# Patient Record
(demographics unavailable — no encounter records)

---

## 2018-06-14 NOTE — HISTORY AND PHYSICAL REPORT
History of Present Illness


Date of examination: 18


Date of admission: 





18 planed date of admission


Chief complaint: 





here for c/s and BTL


History of present illness: 


Pt presents for repeat c/s x 1 with BTL. All risk/benefits/alternatives were d/

w pt and questions were addressed and answered. Consents signed and given pt pt 

to present on day of surgery. 





Current Allergies (reviewed today):


No known allergies


EDC Confirmation:  2018


Gestational Age:  18 5/7 weeks





Past Pregnancy History 


   :      3


   Term Births:      3


   Living Children:   3


   Para:         2


   Mult. Births:      0


   Prev :   1


   Aborta:      0


   Elect. Ab:      0


   Spont. Ab:      0


   Ectopics:      0





Pregnancy # 1


   Delivery date:     


   Weeks Gestation:   term


    labor:     no


   Delivery type:     


   Delivery location:     [facility]


   Infant Sex:      Male


   Birth weight:      6#10





Pregnancy # 2


   Delivery date:     


   Weeks Gestation:   term


   Delivery type:     


   Delivery location:     [place]


   Infant Sex:      femal/male


   Birth weight:      4#11/


   Comments:      Twin gestation, del c/s d/t position








Past Medical History:


   Negative Past Medical History





Past Surgical History:


   





Past Medical History 


Surgery (Non-gyn): 





Abnormal PAP: positive, 





Family Hx: mother - lupus


father - HTN


MGM - rare blood cancer





Social Hx: 


no ETOH/Drugs/Smoking





Infection History 


Hx of STD: none


HIV Risk Eval: no


Hepatitis B Risk Eval: low risk


Personal hx. of genital herpes: no


Partner hx. of genital herpes: no


Rash, Viral, or Febrile illness since last LMP? no


Varicella/Chicken Pox Status: Previous Disease





Genetic History 


 Congenital Heart Defect:


    Mom: no  Dad: no


Canavan Disease:


    Mom: no  Dad: no


Thalassemia


    Mom: no  Dad: no


Neural Tube Defect


    Mom: no  Dad: no


Down's Syndrome


    Mom: no  Dad: no


Tadeo-Sachs


    Mom: no  Dad: no


Sickle Cell Disease/Trait


    Mom: no  Dad: no


Hemophilia


    Mom: no  Dad: no


Muscular Dystrophy


    Mom: no  Dad: no


Cystic Fibrosis


    Mom: no  Dad: no


Prowers Chorea


    Mom: no  Dad: no


Mental Retardation


    Mom: no  Dad: no


Fragile X


    Mom: no  Dad: no


Other Genetic/Chromosomal Disorder


    Mom: no  Dad: no


Child w/other birth defect


    Mom: no  Dad: no





Enviromental Exposures 


Xray Exposure: no


Medication, drug, or alcohol use since LMP: no


Chemical/Other Exposure: no


Exposure to Cat Liter: no


Hx of Parvovirus (Fifth Disease): no


Occupational Exposure to Children: none


Active Medications (reviewed today):


None





Current Allergies (reviewed today):


No known allergies





Past History


Past Medical History: no pertinent history


Past Surgical History:  section


GYN History: denies: abnormal PAP smear


Social history: no significant social history, 





- Obstetrical History


Expected Date of Delivery: 18


Actual Gestation: 38 Week(s) 5 Day(s) 





Medications and Allergies


 Allergies











Allergy/AdvReac Type Severity Reaction Status Date / Time


 


No Known Allergies Allergy   Unverified 18 14:20











 Home Medications











 Medication  Instructions  Recorded  Confirmed  Last Taken  Type


 


No Known Home Medications [No  18 Unknown History





Reported Home Medications]     














Review of Systems


All systems: negative





- Physical Exam


Cardiovascular: Normal S1, Normal S2


Lungs: Positive: Clear to auscultation, Normal air movement


Abdomen: Positive: normal appearance, soft.  Negative: distention, tenderness, 

guarding


Genitourinary (Female): Positive: normal external genitalia, normal perenium


Vagina: Positive: normal moisture


Extremities: Positive: edema (2+ dependent b/l)





- Obstetrical


FHR: auscultation normal





Results


All other labs normal.








Assessment and Plan





- Patient Problems


(1) 39 weeks gestation of pregnancy


Status: Acute   





(2) Previous  section


Status: Acute   


Plan to address problem: 


-admit for above procedure


-consents signed and to be placed on the chart








(3) Encounter for sterilization


Status: Acute   


Plan to address problem: 


-consents singed


-alternatives and risk discussed w/ pt and questions were addressed and 

answered.

## 2018-06-18 NOTE — OPERATIVE REPORT
Operative Report


Operative Report: 


Date of procedure: 2018





Pre-operative diagnosis: 39 weeks gestation


                                 Previous  section 1


                                 Desires permanent sterilization





Post-operative diagnosis: Same





Procedure name(s): Repeat lower transverse  section via Pfannenstiel 

skin incision


Bilateral tubal ligation via modified [place] method





Surgeon: Dr. Russo





Assistant: Ms. Shayla Hilario CST





Anesthesia: Combined spinal epidural





EBL: 600 mL





Urine output: 50 mL of clear urine out at the end of the procedure





Fluids: 1500 mL





Findings: Liveborn female infant weight 7 lbs. 2 oz. Apgars of 8 and 9 at one 

and 5 minutes


            Nuchal cord 1 easily reduced


            Grossly normal fallopian tubes and ovaries bilaterally





Indications: L[place]na presents for scheduled repeat  section with 

bilateral tubal ligation.  All risks benefits and alternatives were discussed 

with the patient.  Patient was given ample time to ask questions all of which 

were addressed and answered.  Consents were signed and placed on the chart.





Procedure: Patient was taking to the operating room.  Patient was then prepped 

and draped in sterile fashion after anesthesia was found to be adequate.  A low 

transverse skin incision was made with the scalpel through previous incisional 

scar and carried down to the underlying layer of fascia with the Bovie.  The 

fascia was then incised in the midline and this incision was extended 

bilaterally with the Bovie.  The superior aspect of the fascia was grasped with 

Kocher clamps tented upward and dissected off of the anterior rectus muscles 

with the scalpel.  In similar fashion the inferior aspect of the fascia was 

grasped with Kocher clamps tented upward and dissected off of the anterior 

rectus muscles.  The rectus muscles were then bluntly divided in the midline.  

The peritoneum was identified and entered into sharply.  The Gurinder retractor 

was placed   A lower transverse uterine incision was made with the scalpel and 

extended bilaterally with the bandage scissors.  Artificial rupture of 

membranes was performed yielding clear amniotic fluid.  The infant's head was 

then delivered atraumatically.  The anterior shoulder and rest of infant 

delivered without difficulty.  The umbilical cord was clamped x2.  The cord was 

cut.  The infant was then placed in sterile bassinet.  The cord blood was 

collected.  The placenta was manually extracted in its entirety.  The uterus 

was exteriorized and cleared of all clots and debris.  The uterine incision was 

closed using 0 Vicryl in a running locking fashion.  A second imbricating layer 

of the same suture was then created.  Attention was then turned to the 

fallopian tubes.  A knuckle of the tube was grasp with the Moriah suture 

ligated and transected with a portion of the tube passed off to pathology.  The 

tube was ligated 2 prior to transecting it.  This was repeated bilaterally.  

Hemostasis was noted.  The posterior cul-de-sac was copiously irrigated.  The 

uterus was returned to the abdomen.  The gutters were also irrigated.  The 

anterior rectus muscles were reapproximated using 3-0 Vicryl.  The anterior 

rectus fascia was reapproximated using 0 Vicryl in a running fashion.  The 

subcuticular fat was reapproximated using 2-0 Vicryl in a running fashion.  The 

skin was reapproximated with staples.  The patient tolerated the procedure 

well.  Sponge lap and needle counts were all correct x3.  Patient was taken to 

the recovery room awake and in stable condition.

## 2018-06-18 NOTE — ANESTHESIA CONSULTATION
Anesthesia Consult and Med Hx


Date of service: 06/18/18





- Airway


Anesthetic Teeth Evaluation: Good


ROM Head & Neck: Adequate


Mental/Hyoid Distance: Adequate


Mallampati Class: Class II


Intubation Access Assessment: Probably Good





- Pre-Operative Health Status


ASA Pre-Surgery Classification: ASA3


Proposed Anesthetic Plan: Epidural, Spinal





- Pulmonary


Hx Asthma: No


COPD: No


Hx Pneumonia: No





- Cardiovascular System


Hx Hypertension: No





- Central Nervous System


Hx Seizures: No


Hx Psychiatric Problems: No





- Endocrine


Hx Renal Disease: No


Hx End Stage Renal Disease: No


Hx Hypothyroidism: No


Hx Hyperthyroidism: No





- Hematic


Hx Anemia: No


Hx Sickle Cell Disease: No





- Other Systems


Hx Alcohol Use: No


Hx Obesity: Yes (BMI 40.9)

## 2018-06-19 NOTE — PROGRESS NOTE
Assessment and Plan


 Patient doing well w/o complaints. VSSAF, H&H 8.9/28.1 (pre-exiting anemia, 

slight drop after surgery, asymptomatic). encouraged ambulation and advance 

diet as tolerated. Encouraged use of abdominal binder while awake, reviewed 

wound care. Plan to continue postop pathway.








- Patient Problems


(1)  delivery delivered


Current Visit: Yes   Status: Acute   





(2) Anemia associated with acute blood loss


Current Visit: Yes   Status: Acute   





Subjective





- Subjective


Date of service: 18


Principal diagnosis: postop day #1 s/p repeat c/s


Patient reports: appetite normal, voiding normally, pain well controlled, 

ambulating normally, no dizzy ambulation, no flatus, no nauseated


[place]: doing well, bottle feeding (breast and bottle feeding)





Objective





- Vital Signs


Latest vital signs: 


 Vital Signs











  Temp Pulse Resp BP BP Pulse Ox


 


 18 00:05  98.5 F  58 L  16   113/77 


 


 18 20:45  98.6 F  57 L  20   131/72 


 


 18 16:10  97.7 F  52 L  20   131/85  98


 


 18 15:36  97.6 F     


 


 18 15:35  97.6 F  53 L  8 L  164/77   97


 


 18 15:34    16   


 


 18 15:30   52 L  9 L  164/77   100


 


 18 15:25   52 L  13  150/75   100


 


 18 15:19   54 L  17  142/77   100


 


 18 15:13   54 L  18  157/73   100


 


 18 15:07   51 L  12  147/79   100


 


 18 15:01   57 L  17  145/81   99


 


 18 15:00   54 L  18  145/81   99


 


 18 14:45   56 L  11 L  124/75   100


 


 18 14:29   75  11 L  114/63   100


 


 18 14:28  97.6 F  84  16  114/63   100


 


 18 12:57   76     100


 


 18 12:52   63     100


 


 18 12:46   68     100


 


 18 12:41   64     100


 


 18 12:36   60     100


 


 18 12:31   87     100


 


 18 12:26   68     100


 


 18 12:25   87     38 L


 


 18 12:21   68     100


 


 18 12:15   65     100


 


 18 12:10   69     100


 


 18 12:05   71     100


 


 18 12:00   67     100


 


 18 11:55   77     100


 


 18 11:50   67     100


 


 18 11:45   61     100


 


 18 11:40   68     100


 


 18 11:35   59 L     100


 


 18 11:13   70     99


 


 18 11:08   71     98


 


 18 11:03   64     99


 


 18 10:58   70     99


 


 18 10:53   71     99


 


 18 10:51   85   114/76  


 


 18 10:48   74     98


 


 18 10:43   70     100


 


 18 10:38   62     100


 


 18 10:33   64   131/80   99


 


 18 10:29  96.8 F L  68  20   131/80  99








 Intake and Output











 18





 15:59 23:59 07:59


 


Intake Total 2900 90 240


 


Output Total 600 600 


 


Balance 2300 -510 240


 


Intake:   


 


  IV 2900  


 


    Lactated Ringers 1,000 ml 1000  





    @ 2250 mls/hr IV PREOP   





    [facility] Rx#:[id number]   


 


  Oral  90 


 


  Intake, Free Water   240


 


Output:   


 


  Urine 600 600 


 


    Indwelling Catheter  600 


 


Other:   


 


  Total, Intake Amount  90 


 


  Total, Output Amount  600 


 


  Weight 122.016 kg  


 


  Estimated Blood Loss 600  














- Exam


Breasts: Present: normal


Cardiovascular: Present: Regular rate


Lungs: Present: Clear to auscultation, Normal air movement


Abdomen: Present: normal appearance, soft


Vulva: both: normal


Uterus: Present: normal, firm, fundal height at umbilicus


Extremities: Present: normal


Incision: Present: normal, dry, intact





- Labs


Labs: 


 Abnormal lab results











  18 Range/Units





  10:20 00:35 


 


Hgb  9.9 L  8.9 L  (10.1-14.3)  gm/dl


 


Hct   28.1 L  (30.3-42.9)  %


 


MCV  68 L   (79-97)  fl


 


MCH  21 L   (28-32)  pg


 


RDW  17.7 H   (13.2-15.2)  %

## 2018-06-20 NOTE — DISCHARGE SUMMARY
Providers





- Providers


Date of Admission: 


18 09:50





Date of discharge: 18 (pt desires d/c today)


Attending physician: 


CAROLINA ROMANO





Primary care physician: 


CAROLINA ROMANO








Hospitalization


Reason for admission:  section


Procedure: bilateral tubal ligation, repeat low transverse


Episiotomy: none


Laceration: none


Incision: normal, dry, intact


Other postpartum procedures: none


Postpartum complications: none


Discharge diagnosis: IUP at term delivered


 baby: female


Hospital course: 





uncomplicated repeat  section with tubal ligation





Pt resting voicing no complaints. VSS FF below umb Lochia scant Incision D&I 

Stable H&H Asymptomatic anemia Doing well s/p c/s. P: d/c today with 

instructions RTO 1 week postop care. RX provided @ d/c


Condition at discharge: Good


Disposition: DC-01 TO HOME OR SELFCARE





- Discharge Diagnoses


(1)  delivery delivered


Status: Acute   Comment: RTO 1 week Postop care   





Plan





- Discharge Medications


Prescriptions: 


Docusate Sodium [Colace] 100 mg PO BID PRN #30 capsule


 PRN Reason: Constipation


Ferrous Sulfate 325 mg PO BID #60 tablet.dr


Ibuprofen 800 mg PO Q6HR #30 tablet


Lidocain2.5%/Prilocai2.5% [Emla] 2 gm TP ONCE #1 tube


oxyCODONE /ACETAMINOPHEN [Percocet 5/325] 1 tab PO Q4HR #30 tab





- Provider Discharge Summary


Activity: routine, no sex for 6 weeks, no heavy lifting 4 weeks, no strenuous 

exercise


Diet: routine


Instructions: routine


Additional instructions: 


[]  Smoking cessation referral if applicable(refer to patient education folder 

for contact #)


[]  Refer to [facility]'s [facility] Booklet








Call your doctor immediately for:


* Fever > 100.5


* Heavy vaginal bleeding ( >1 pad per hour)


* Severe persistent headache


* Shortness of breath


* Reddened, hot, painful area to leg or breast


* Drainage or odor from incision.





* Keep incision clean and dry at all times and follow doctor's instructions 

regarding bathing/showering











- Follow up plan


Follow up: 


CAROLINA ROMANO MD [Primary Care Provider] - 7 Days


(Congratulations!


Please keep scheduled postoperative appointment.


Take medications as prescribed.


Call [phone] with any concerns.)